# Patient Record
Sex: MALE | Race: WHITE | NOT HISPANIC OR LATINO
[De-identification: names, ages, dates, MRNs, and addresses within clinical notes are randomized per-mention and may not be internally consistent; named-entity substitution may affect disease eponyms.]

---

## 2018-11-26 ENCOUNTER — APPOINTMENT (OUTPATIENT)
Dept: HEART AND VASCULAR | Facility: CLINIC | Age: 58
End: 2018-11-26
Payer: COMMERCIAL

## 2018-11-26 VITALS
HEART RATE: 96 BPM | SYSTOLIC BLOOD PRESSURE: 130 MMHG | OXYGEN SATURATION: 96 % | BODY MASS INDEX: 33.86 KG/M2 | RESPIRATION RATE: 12 BRPM | WEIGHT: 250 LBS | DIASTOLIC BLOOD PRESSURE: 76 MMHG | HEIGHT: 72 IN

## 2018-11-26 DIAGNOSIS — Z82.3 FAMILY HISTORY OF STROKE: ICD-10-CM

## 2018-11-26 DIAGNOSIS — Z87.438 PERSONAL HISTORY OF OTHER DISEASES OF MALE GENITAL ORGANS: ICD-10-CM

## 2018-11-26 DIAGNOSIS — Z78.9 OTHER SPECIFIED HEALTH STATUS: ICD-10-CM

## 2018-11-26 PROBLEM — Z00.00 ENCOUNTER FOR PREVENTIVE HEALTH EXAMINATION: Status: ACTIVE | Noted: 2018-11-26

## 2018-11-26 PROCEDURE — 93306 TTE W/DOPPLER COMPLETE: CPT

## 2018-11-26 PROCEDURE — 93880 EXTRACRANIAL BILAT STUDY: CPT

## 2018-11-26 PROCEDURE — 99204 OFFICE O/P NEW MOD 45 MIN: CPT

## 2018-11-26 RX ORDER — ADHESIVE TAPE 3"X 2.3 YD
50 MCG TAPE, NON-MEDICATED TOPICAL
Refills: 0 | Status: ACTIVE | COMMUNITY

## 2018-11-26 RX ORDER — TADALAFIL 20 MG/1
20 TABLET, FILM COATED ORAL
Refills: 0 | Status: ACTIVE | COMMUNITY

## 2018-11-26 NOTE — PHYSICAL EXAM
[General Appearance - Well Developed] : well developed [Normal Appearance] : normal appearance [Well Groomed] : well groomed [General Appearance - Well Nourished] : well nourished [No Deformities] : no deformities [General Appearance - In No Acute Distress] : no acute distress [Normal Oral Mucosa] : normal oral mucosa [No Oral Pallor] : no oral pallor [No Oral Cyanosis] : no oral cyanosis [Normal Jugular Venous A Waves Present] : normal jugular venous A waves present [Normal Jugular Venous V Waves Present] : normal jugular venous V waves present [No Jugular Venous Billy A Waves] : no jugular venous billy A waves [5th Left ICS - MCL] : palpated at the 5th LICS in the midclavicular line [Normal] : normal [Tachycardia] : tachycardic [Irregularly Irregular] : irregularly irregular [Normal S1] : normal S1 [Normal S2] : normal S2 [III] : a grade 3 [Right Carotid Bruit] : right carotid bruit heard [Left Carotid Bruit] : left carotid bruit heard [No Pitting Edema] : no pitting edema present [Respiration, Rhythm And Depth] : normal respiratory rhythm and effort [Exaggerated Use Of Accessory Muscles For Inspiration] : no accessory muscle use [Auscultation Breath Sounds / Voice Sounds] : lungs were clear to auscultation bilaterally [Abdomen Soft] : soft [Abdomen Tenderness] : non-tender [Abdomen Mass (___ Cm)] : no abdominal mass palpated [Abnormal Walk] : normal gait [Gait - Sufficient For Exercise Testing] : the gait was sufficient for exercise testing [Nail Clubbing] : no clubbing of the fingernails [Cyanosis, Localized] : no localized cyanosis [Petechial Hemorrhages (___cm)] : no petechial hemorrhages [] : no ischemic changes [Oriented To Time, Place, And Person] : oriented to person, place, and time [Affect] : the affect was normal [Mood] : the mood was normal [No Anxiety] : not feeling anxious

## 2018-11-28 ENCOUNTER — APPOINTMENT (OUTPATIENT)
Dept: HEART AND VASCULAR | Facility: CLINIC | Age: 58
End: 2018-11-28
Payer: COMMERCIAL

## 2018-11-28 DIAGNOSIS — R53.83 OTHER FATIGUE: ICD-10-CM

## 2018-11-28 PROCEDURE — 93000 ELECTROCARDIOGRAM COMPLETE: CPT

## 2018-11-28 PROCEDURE — 99212 OFFICE O/P EST SF 10 MIN: CPT

## 2018-11-29 ENCOUNTER — RESULT CHARGE (OUTPATIENT)
Age: 58
End: 2018-11-29

## 2018-11-29 VITALS — SYSTOLIC BLOOD PRESSURE: 128 MMHG | DIASTOLIC BLOOD PRESSURE: 78 MMHG | HEART RATE: 107 BPM

## 2018-11-29 PROBLEM — R53.83 FATIGUE: Status: ACTIVE | Noted: 2018-11-29

## 2018-11-29 NOTE — DISCUSSION/SUMMARY
[Patient] : the patient [FreeTextEntry1] : Start Cardizem 120mg XR; come back in 1 week for FU and repeat EKG.\par Pt may need to plan for cardiac ablasion if does not conver to NSR.\par

## 2018-11-29 NOTE — REASON FOR VISIT
[Follow-Up - Clinic] : a clinic follow-up of [Atrial Fibrillation] : atrial fibrillation [Dizziness] : dizziness [FreeTextEntry1] : Pt recently started on Metoprolol Succ ER 50mg x daily for a-fib; reported feeling dizzy and fatigued today.

## 2018-11-29 NOTE — PHYSICAL EXAM
[General Appearance - Well Developed] : well developed [Normal Appearance] : normal appearance [Well Groomed] : well groomed [General Appearance - Well Nourished] : well nourished [No Deformities] : no deformities [General Appearance - In No Acute Distress] : no acute distress [Normal Jugular Venous A Waves Present] : normal jugular venous A waves present [Normal Jugular Venous V Waves Present] : normal jugular venous V waves present [No Jugular Venous Billy A Waves] : no jugular venous billy A waves [] : no respiratory distress [Respiration, Rhythm And Depth] : normal respiratory rhythm and effort [Exaggerated Use Of Accessory Muscles For Inspiration] : no accessory muscle use [Auscultation Breath Sounds / Voice Sounds] : lungs were clear to auscultation bilaterally [Heart Rate And Rhythm] : heart rate and rhythm were normal [Heart Sounds] : normal S1 and S2 [Murmurs] : no murmurs present [Abnormal Walk] : normal gait [Gait - Sufficient For Exercise Testing] : the gait was sufficient for exercise testing

## 2018-11-29 NOTE — END OF VISIT
[FreeTextEntry3] : Discussed with Dr Garg [Time Spent: ___ minutes] : I have spent [unfilled] minutes of face to face time with the patient

## 2018-12-04 NOTE — HISTORY OF PRESENT ILLNESS
[FreeTextEntry1] : 58-year-old male with a past medical history of hyperlipidemia and erectile dysfunction comes in for evaluation which fibrillation. Patient went to his PCP for routine visit and was found to be in A. fib. Patient denies any palpitations chest pain shortness of breath PND orthopnea.

## 2018-12-04 NOTE — DISCUSSION/SUMMARY
[FreeTextEntry1] : 1. Atrial fibrillation: Will start Toprol 50 mg daily and xarelto 20 mg daily. Will obtain an echocardiogram to assess which is left atrial enlargement. Reviewed EKG from PCP A. fib poor R-wave progression otherwise within normal limits. Patient had recent blood work by PCP will get a copy for review. Will also need to rule out ischemic heart disease in this patient with new onset A. fib, history of erectile dysfunction concern for ischemia driven A. fib. pt advised to stop asa while on xarelto \par chads-vasc score 2\par 2. Hyperlipidemia: Continue atorvastatin\par 3. Carotid bruit: Carotid duplex

## 2018-12-05 ENCOUNTER — APPOINTMENT (OUTPATIENT)
Dept: HEART AND VASCULAR | Facility: CLINIC | Age: 58
End: 2018-12-05
Payer: COMMERCIAL

## 2018-12-05 PROCEDURE — 93000 ELECTROCARDIOGRAM COMPLETE: CPT

## 2018-12-05 PROCEDURE — 99213 OFFICE O/P EST LOW 20 MIN: CPT

## 2018-12-05 RX ORDER — METOPROLOL SUCCINATE 50 MG/1
50 TABLET, EXTENDED RELEASE ORAL
Qty: 30 | Refills: 3 | Status: DISCONTINUED | COMMUNITY
Start: 2018-11-26 | End: 2018-12-05

## 2018-12-06 ENCOUNTER — RESULT CHARGE (OUTPATIENT)
Age: 58
End: 2018-12-06

## 2018-12-06 VITALS — HEART RATE: 87 BPM | DIASTOLIC BLOOD PRESSURE: 80 MMHG | SYSTOLIC BLOOD PRESSURE: 130 MMHG

## 2018-12-06 NOTE — DISCUSSION/SUMMARY
[FreeTextEntry1] : Continue current medications as prescribed. \par Pt is encouraged to call or come back to the office if feels SOB, CP, has blurry vision - pt verbalized good understanding.

## 2018-12-06 NOTE — HISTORY OF PRESENT ILLNESS
[FreeTextEntry1] : Pt is seen in the office for a-fib FU.  Pt was started on Metoprolol Succ 50mg and did not tolerate it well; pt returned to the office and was switched to Cardizem 120mg PO.  Pt tolerates new medications well; pt denies c/o SOB, CP, palpitations, blurry vision, nausea, vomiting, fatigue. \par

## 2018-12-06 NOTE — PHYSICAL EXAM
[General Appearance - Well Developed] : well developed [Normal Appearance] : normal appearance [Well Groomed] : well groomed [General Appearance - Well Nourished] : well nourished [No Deformities] : no deformities [General Appearance - In No Acute Distress] : no acute distress [Normal Jugular Venous A Waves Present] : normal jugular venous A waves present [Normal Jugular Venous V Waves Present] : normal jugular venous V waves present [No Jugular Venous Billy A Waves] : no jugular venous billy A waves [Respiration, Rhythm And Depth] : normal respiratory rhythm and effort [Exaggerated Use Of Accessory Muscles For Inspiration] : no accessory muscle use [Auscultation Breath Sounds / Voice Sounds] : lungs were clear to auscultation bilaterally [Heart Rate And Rhythm] : heart rate and rhythm were normal [Heart Sounds] : normal S1 and S2 [Murmurs] : no murmurs present [Nail Clubbing] : no clubbing of the fingernails [Cyanosis, Localized] : no localized cyanosis [Petechial Hemorrhages (___cm)] : no petechial hemorrhages [] : no ischemic changes

## 2018-12-10 RX ORDER — RIVAROXABAN 20 MG/1
20 TABLET, FILM COATED ORAL
Qty: 30 | Refills: 5 | Status: DISCONTINUED | COMMUNITY
Start: 2018-11-26 | End: 2018-12-10

## 2018-12-17 ENCOUNTER — APPOINTMENT (OUTPATIENT)
Dept: HEART AND VASCULAR | Facility: CLINIC | Age: 58
End: 2018-12-17

## 2018-12-21 ENCOUNTER — RX RENEWAL (OUTPATIENT)
Age: 58
End: 2018-12-21

## 2018-12-24 ENCOUNTER — APPOINTMENT (OUTPATIENT)
Dept: HEART AND VASCULAR | Facility: CLINIC | Age: 58
End: 2018-12-24

## 2018-12-26 ENCOUNTER — APPOINTMENT (OUTPATIENT)
Dept: HEART AND VASCULAR | Facility: CLINIC | Age: 58
End: 2018-12-26

## 2019-01-03 ENCOUNTER — RX RENEWAL (OUTPATIENT)
Age: 59
End: 2019-01-03

## 2019-01-14 RX ORDER — ATORVASTATIN CALCIUM 40 MG/1
40 TABLET, FILM COATED ORAL
Refills: 0 | Status: DISCONTINUED | COMMUNITY
End: 2019-01-14

## 2019-02-05 ENCOUNTER — APPOINTMENT (OUTPATIENT)
Dept: HEART AND VASCULAR | Facility: CLINIC | Age: 59
End: 2019-02-05

## 2019-02-05 RX ORDER — DILTIAZEM HYDROCHLORIDE 120 MG/1
120 CAPSULE, EXTENDED RELEASE ORAL DAILY
Qty: 30 | Refills: 2 | Status: DISCONTINUED | COMMUNITY
Start: 2018-11-28 | End: 2019-02-05

## 2019-02-12 ENCOUNTER — APPOINTMENT (OUTPATIENT)
Dept: HEART AND VASCULAR | Facility: CLINIC | Age: 59
End: 2019-02-12
Payer: COMMERCIAL

## 2019-02-12 VITALS
RESPIRATION RATE: 12 BRPM | SYSTOLIC BLOOD PRESSURE: 114 MMHG | HEART RATE: 96 BPM | DIASTOLIC BLOOD PRESSURE: 88 MMHG | WEIGHT: 250 LBS | BODY MASS INDEX: 33.86 KG/M2 | HEIGHT: 72 IN

## 2019-02-12 PROCEDURE — 93000 ELECTROCARDIOGRAM COMPLETE: CPT

## 2019-02-12 PROCEDURE — 99213 OFFICE O/P EST LOW 20 MIN: CPT

## 2019-02-14 ENCOUNTER — APPOINTMENT (OUTPATIENT)
Dept: HEART AND VASCULAR | Facility: CLINIC | Age: 59
End: 2019-02-14
Payer: COMMERCIAL

## 2019-02-14 PROCEDURE — 93224 XTRNL ECG REC UP TO 48 HRS: CPT

## 2019-02-21 NOTE — DISCUSSION/SUMMARY
[FreeTextEntry1] : 1. Atrial fibrillation: For now will continue Cardizem 240 mg daily will obtain an EKG. If in normal sinus rhythm will consider a 24-hour Holter monitor to ascertain heart rate variability. This patient's resting heart rate today is . If well controlled in sinus rhythm will reattempt the non-nuclear stress test.\par addendum: ekg reviewed sinus tach. will place 24hr holter monitor to asses HRV \par \par ADDENDUM 2/21/19: PT OPTIMIZED FOR PLANNED PROCEDURE (EGD/CF) HOLD ELIQUIS FOR 3 DOSE PRIOR TO PROCEDURE.

## 2019-02-21 NOTE — HISTORY OF PRESENT ILLNESS
[FreeTextEntry1] : 58-year-old male with a past medical history of paroxysmal atrial fibrillation comes in for followup. She was scheduled for a non-nuclear stress test was found to be in rapid A. fib test was aborted and patient's Cardizem was increased to 240 mg. Patient has been tolerating Cardizem without any major side effects. Patient does report as of late anxiety and stress in his life.

## 2019-02-21 NOTE — PHYSICAL EXAM
[General Appearance - Well Developed] : well developed [Normal Appearance] : normal appearance [Well Groomed] : well groomed [General Appearance - Well Nourished] : well nourished [No Deformities] : no deformities [General Appearance - In No Acute Distress] : no acute distress [Normal Oral Mucosa] : normal oral mucosa [No Oral Pallor] : no oral pallor [No Oral Cyanosis] : no oral cyanosis [Normal Jugular Venous A Waves Present] : normal jugular venous A waves present [Normal Jugular Venous V Waves Present] : normal jugular venous V waves present [No Jugular Venous Billy A Waves] : no jugular venous billy A waves [Respiration, Rhythm And Depth] : normal respiratory rhythm and effort [Exaggerated Use Of Accessory Muscles For Inspiration] : no accessory muscle use [Auscultation Breath Sounds / Voice Sounds] : lungs were clear to auscultation bilaterally [Heart Sounds] : normal S1 and S2 [Arterial Pulses Normal] : the arterial pulses were normal [Edema] : no peripheral edema present [Abdomen Soft] : soft [Abdomen Tenderness] : non-tender [Abdomen Mass (___ Cm)] : no abdominal mass palpated [Abnormal Walk] : normal gait [Gait - Sufficient For Exercise Testing] : the gait was sufficient for exercise testing [Nail Clubbing] : no clubbing of the fingernails [Cyanosis, Localized] : no localized cyanosis [Petechial Hemorrhages (___cm)] : no petechial hemorrhages [] : no ischemic changes [Oriented To Time, Place, And Person] : oriented to person, place, and time [Affect] : the affect was normal [Mood] : the mood was normal [No Anxiety] : not feeling anxious [FreeTextEntry1] : padmini

## 2019-04-09 ENCOUNTER — RX RENEWAL (OUTPATIENT)
Age: 59
End: 2019-04-09

## 2019-05-28 ENCOUNTER — APPOINTMENT (OUTPATIENT)
Dept: HEART AND VASCULAR | Facility: CLINIC | Age: 59
End: 2019-05-28
Payer: COMMERCIAL

## 2019-05-28 VITALS
RESPIRATION RATE: 12 BRPM | HEIGHT: 72 IN | SYSTOLIC BLOOD PRESSURE: 110 MMHG | DIASTOLIC BLOOD PRESSURE: 78 MMHG | HEART RATE: 98 BPM | BODY MASS INDEX: 33.86 KG/M2 | WEIGHT: 250 LBS

## 2019-05-28 PROCEDURE — 99214 OFFICE O/P EST MOD 30 MIN: CPT

## 2019-05-28 NOTE — DISCUSSION/SUMMARY
[FreeTextEntry1] : 1. Atrial fibrillation: For now continue diltiazem 240 mg as well as a liquid 5 mg twice daily will obtain an EKG.\par 2. Hyperlipidemia: Continue Crestor atenolol for that\par 3. Fatigue/shortness of breath: Still have not ruled out ischemic heart disease as a cause of new onset A. fib as well as exertional shortness of breath. Patient unable to complete a Kirit protocol due to resting heart rates in the 90s and low 100s due to anxiety and stress from being in the office will attempt to obtain prior authorization for a lexiscan nuclear stress test

## 2019-05-28 NOTE — HISTORY OF PRESENT ILLNESS
[FreeTextEntry1] : 58-year-old male with a past medical history of atrial fibrillation, diabetes hyperlipidemia comes in for routine followup. Patient denies any chest pain PND or orthopnea. Attempted to obtain a nuclear stress test as well as a non-nuclear stress test unsuccessfully due to resting heart rates in the high 90s low 100s. Patient reports an average heart rates are in the 80s which was proven on Holter monitor.

## 2019-05-28 NOTE — PHYSICAL EXAM
[General Appearance - Well Developed] : well developed [Normal Appearance] : normal appearance [Well Groomed] : well groomed [No Deformities] : no deformities [General Appearance - Well Nourished] : well nourished [General Appearance - In No Acute Distress] : no acute distress [Normal Oral Mucosa] : normal oral mucosa [No Oral Pallor] : no oral pallor [No Oral Cyanosis] : no oral cyanosis [Normal Jugular Venous A Waves Present] : normal jugular venous A waves present [Normal Jugular Venous V Waves Present] : normal jugular venous V waves present [No Jugular Venous Billy A Waves] : no jugular venous billy A waves [Respiration, Rhythm And Depth] : normal respiratory rhythm and effort [Exaggerated Use Of Accessory Muscles For Inspiration] : no accessory muscle use [Auscultation Breath Sounds / Voice Sounds] : lungs were clear to auscultation bilaterally [Heart Sounds] : normal S1 and S2 [Arterial Pulses Normal] : the arterial pulses were normal [Edema] : no peripheral edema present [FreeTextEntry1] : irreg irreg [Abdomen Soft] : soft [Abdomen Tenderness] : non-tender [Abdomen Mass (___ Cm)] : no abdominal mass palpated [Abnormal Walk] : normal gait [Gait - Sufficient For Exercise Testing] : the gait was sufficient for exercise testing [Nail Clubbing] : no clubbing of the fingernails [Cyanosis, Localized] : no localized cyanosis [Petechial Hemorrhages (___cm)] : no petechial hemorrhages [] : no ischemic changes [Oriented To Time, Place, And Person] : oriented to person, place, and time [Mood] : the mood was normal [Affect] : the affect was normal [No Anxiety] : not feeling anxious

## 2019-06-17 ENCOUNTER — APPOINTMENT (OUTPATIENT)
Dept: HEART AND VASCULAR | Facility: CLINIC | Age: 59
End: 2019-06-17
Payer: COMMERCIAL

## 2019-06-17 VITALS
BODY MASS INDEX: 33.86 KG/M2 | HEART RATE: 79 BPM | DIASTOLIC BLOOD PRESSURE: 70 MMHG | SYSTOLIC BLOOD PRESSURE: 120 MMHG | HEIGHT: 72 IN | WEIGHT: 250 LBS

## 2019-06-17 DIAGNOSIS — R06.02 SHORTNESS OF BREATH: ICD-10-CM

## 2019-06-17 PROCEDURE — 96374 THER/PROPH/DIAG INJ IV PUSH: CPT | Mod: 59

## 2019-06-17 PROCEDURE — 93015 CV STRESS TEST SUPVJ I&R: CPT

## 2019-06-17 PROCEDURE — 78452 HT MUSCLE IMAGE SPECT MULT: CPT

## 2019-06-17 PROCEDURE — A9500: CPT

## 2019-06-17 NOTE — PHYSICAL EXAM
[General Appearance - Well Developed] : well developed [Normal Appearance] : normal appearance [General Appearance - Well Nourished] : well nourished [Well Groomed] : well groomed [No Deformities] : no deformities [General Appearance - In No Acute Distress] : no acute distress [Eyelids - No Xanthelasma] : the eyelids demonstrated no xanthelasmas [Normal Conjunctiva] : the conjunctiva exhibited no abnormalities [Normal Oral Mucosa] : normal oral mucosa [No Oral Pallor] : no oral pallor [No Oral Cyanosis] : no oral cyanosis [Normal Jugular Venous A Waves Present] : normal jugular venous A waves present [Normal Jugular Venous V Waves Present] : normal jugular venous V waves present [] : no respiratory distress [No Jugular Venous Billy A Waves] : no jugular venous billy A waves [Respiration, Rhythm And Depth] : normal respiratory rhythm and effort [Auscultation Breath Sounds / Voice Sounds] : lungs were clear to auscultation bilaterally [Exaggerated Use Of Accessory Muscles For Inspiration] : no accessory muscle use [Heart Rate And Rhythm] : heart rate and rhythm were normal [Heart Sounds] : normal S1 and S2 [Murmurs] : no murmurs present

## 2019-09-03 ENCOUNTER — RX RENEWAL (OUTPATIENT)
Age: 59
End: 2019-09-03

## 2019-10-28 ENCOUNTER — APPOINTMENT (OUTPATIENT)
Dept: HEART AND VASCULAR | Facility: CLINIC | Age: 59
End: 2019-10-28
Payer: COMMERCIAL

## 2019-10-28 VITALS
HEIGHT: 72 IN | WEIGHT: 251 LBS | HEART RATE: 86 BPM | RESPIRATION RATE: 12 BRPM | DIASTOLIC BLOOD PRESSURE: 78 MMHG | BODY MASS INDEX: 34 KG/M2 | SYSTOLIC BLOOD PRESSURE: 124 MMHG

## 2019-10-28 PROCEDURE — 99214 OFFICE O/P EST MOD 30 MIN: CPT

## 2019-10-28 PROCEDURE — 93000 ELECTROCARDIOGRAM COMPLETE: CPT

## 2019-10-28 NOTE — HISTORY OF PRESENT ILLNESS
[FreeTextEntry1] : 58-year-old male with a past medical history of diabetes paroxysmal atrial fibrillation comes in for routine followup. Patient denies any chest pain shortness of breath PND orthopnea. Overall feeling well.

## 2019-10-28 NOTE — DISCUSSION/SUMMARY
[FreeTextEntry1] : 1. Atrial fibrillation: Continue diltiazem 240 mg one tablet daily continue eliquis 5 mg twice daily\par 2. Hyperlipidemia: Continue Crestor 10 mg daily

## 2019-10-28 NOTE — PHYSICAL EXAM
[General Appearance - Well Developed] : well developed [Normal Appearance] : normal appearance [Well Groomed] : well groomed [General Appearance - Well Nourished] : well nourished [No Deformities] : no deformities [General Appearance - In No Acute Distress] : no acute distress [Normal Oral Mucosa] : normal oral mucosa [No Oral Pallor] : no oral pallor [No Oral Cyanosis] : no oral cyanosis [Normal Jugular Venous A Waves Present] : normal jugular venous A waves present [Normal Jugular Venous V Waves Present] : normal jugular venous V waves present [No Jugular Venous Billy A Waves] : no jugular venous billy A waves [Respiration, Rhythm And Depth] : normal respiratory rhythm and effort [Exaggerated Use Of Accessory Muscles For Inspiration] : no accessory muscle use [Auscultation Breath Sounds / Voice Sounds] : lungs were clear to auscultation bilaterally [Heart Sounds] : normal S1 and S2 [Murmurs] : no murmurs present [Arterial Pulses Normal] : the arterial pulses were normal [Edema] : no peripheral edema present [FreeTextEntry1] : irreg irreg [Abdomen Soft] : soft [Abdomen Tenderness] : non-tender [Abdomen Mass (___ Cm)] : no abdominal mass palpated [Abnormal Walk] : normal gait [Gait - Sufficient For Exercise Testing] : the gait was sufficient for exercise testing [Nail Clubbing] : no clubbing of the fingernails [Cyanosis, Localized] : no localized cyanosis [Petechial Hemorrhages (___cm)] : no petechial hemorrhages [] : no ischemic changes [Oriented To Time, Place, And Person] : oriented to person, place, and time [Affect] : the affect was normal [Mood] : the mood was normal [No Anxiety] : not feeling anxious

## 2020-01-02 ENCOUNTER — RX RENEWAL (OUTPATIENT)
Age: 60
End: 2020-01-02

## 2020-04-28 ENCOUNTER — RX RENEWAL (OUTPATIENT)
Age: 60
End: 2020-04-28

## 2020-05-06 RX ORDER — DILTIAZEM HYDROCHLORIDE 240 MG/1
240 CAPSULE, EXTENDED RELEASE ORAL
Qty: 30 | Refills: 3 | Status: DISCONTINUED | COMMUNITY
Start: 2019-02-05 | End: 2020-05-06

## 2020-06-03 ENCOUNTER — APPOINTMENT (OUTPATIENT)
Dept: UROLOGY | Facility: CLINIC | Age: 60
End: 2020-06-03
Payer: COMMERCIAL

## 2020-06-03 VITALS
DIASTOLIC BLOOD PRESSURE: 83 MMHG | HEART RATE: 120 BPM | BODY MASS INDEX: 34 KG/M2 | WEIGHT: 251 LBS | SYSTOLIC BLOOD PRESSURE: 141 MMHG | HEIGHT: 72 IN

## 2020-06-03 DIAGNOSIS — N50.0 ATROPHY OF TESTIS: ICD-10-CM

## 2020-06-03 PROCEDURE — 99204 OFFICE O/P NEW MOD 45 MIN: CPT

## 2020-06-19 ENCOUNTER — APPOINTMENT (OUTPATIENT)
Dept: UROLOGY | Facility: CLINIC | Age: 60
End: 2020-06-19
Payer: COMMERCIAL

## 2020-06-19 VITALS
DIASTOLIC BLOOD PRESSURE: 82 MMHG | SYSTOLIC BLOOD PRESSURE: 144 MMHG | HEIGHT: 72 IN | TEMPERATURE: 97.6 F | HEART RATE: 86 BPM | WEIGHT: 250 LBS | BODY MASS INDEX: 33.86 KG/M2

## 2020-06-19 PROCEDURE — 99214 OFFICE O/P EST MOD 30 MIN: CPT

## 2020-06-19 NOTE — HISTORY OF PRESENT ILLNESS
[Erectile Dysfunction] : Erectile Dysfunction [FreeTextEntry1] : Jessica is a 51-year-old male We have been following for erectile dysfunction. He has had intermittent erectile dysfunction refractory to Cialis. He presents today to review his blood work and [place] criteria.\par \par He did obtain a scrotal ultrasound for possible epididymal cyst however, the imaging center was closed And we don't have the report. He will fax us his copy of the results to review with him When we review the 4K blood test that he is going to get, please see below\par

## 2020-06-19 NOTE — HISTORY OF PRESENT ILLNESS
[Erectile Dysfunction] : Erectile Dysfunction [FreeTextEntry1] : Rabbi Scott is a 51-year-old male who is  to his wife who is 52 years old in excellent relationship for 34 years. They have had a good sex life though for the last 89 or even 10 years he’s needed Cialis initially at the 10 now is up to the 20 mg dose. About six months ago that stopped working as well. There are times he cannot get an erection lately eight of the 10 times save last tried he can get rich enough to get in. Wanted to a satisfactory six he popped right away and to he couldn’t even get written enough to penetrate. His nighttime in morning erections are as hard as they have ever been implying that this is arterial not venous in origin. He feels there libidos normal, he finds her sexually stimulating and he does not think any of the problems to their relationship.\par \par He is a non-insulin-dependent diabetic with elevated cholesterol and about 18 months ago develop paroxysmal atrial fibrillation. He had and inguinal hernia repaired many years ago and now takes Metformin, diltiazem, Crestor, eloquence, vitamin D, B12 them when necessary Claritin. In general he feels he is in otherwise good health is a history of injuries or accidents at he’s been using a CPAP machine for 10 years. 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 He will have alcohol on the weekends does not use recreational drugs and “tries” exercise a few times per week but is really not doing what he should. He denies any and all other urologic issues. His family history is only positive for diabetes and heart disease.\par

## 2020-06-19 NOTE — LETTER BODY
[Dear  ___] : Dear  [unfilled], [Courtesy Letter:] : I had the pleasure of seeing your patient, [unfilled], in my office today. [Please see my note below.] : Please see my note below. [Sincerely,] : Sincerely, [Consult Closing:] : Thank you very much for allowing me to participate in the care of this patient.  If you have any questions, please do not hesitate to contact me. [FreeTextEntry2] : Desmond Coyle M.D.\par [address] [place] [address]\par [place] [phone]\par

## 2020-06-19 NOTE — LETTER HEADER
[FreeTextEntry3] : Martha Reynolds M.D.\par Director of Urology\par [facility]\par [address] [place] [address], [address]\[place] [address]

## 2020-06-19 NOTE — PHYSICAL EXAM
[General Appearance - Well Developed] : well developed [Normal Appearance] : normal appearance [Well Groomed] : well groomed [General Appearance - Well Nourished] : well nourished [General Appearance - In No Acute Distress] : no acute distress [Edema] : no peripheral edema [] : no respiratory distress [Respiration, Rhythm And Depth] : normal respiratory rhythm and effort [Exaggerated Use Of Accessory Muscles For Inspiration] : no accessory muscle use [Auscultation Breath Sounds / Voice Sounds] : lungs were clear to auscultation bilaterally [Abdomen Soft] : soft [Abdomen Tenderness] : non-tender [Abdomen Hernia] : no hernia was discovered [Costovertebral Angle Tenderness] : no ~M costovertebral angle tenderness [Penis Abnormality] : normal circumcised penis [Urethral Meatus] : meatus normal [Testes Tenderness] : no tenderness of the testes [Rectal Exam - Rectum] : digital rectal exam was normal [Testes Mass (___cm)] : there were no testicular masses [Anus Abnormality] : the anus and perineum were normal [Prostate Tenderness] : the prostate was not tender [No Prostate Nodules] : no prostate nodules [Prostate Size ___ gm] : prostate size [unfilled] gm [Normal Station and Gait] : the gait and station were normal for the patient's age [Oriented To Time, Place, And Person] : oriented to person, place, and time [Affect] : the affect was normal [Not Anxious] : not anxious [Mood] : the mood was normal [Inguinal Lymph Nodes Enlarged Bilaterally] : inguinal [FreeTextEntry1] : DTR's & BC reflexes were intact

## 2020-06-19 NOTE — ASSESSMENT
[FreeTextEntry1] : His hormones are within normal limits. We discussed Doppler study to try injectables however, he reports that his erections are decent enough for now on Cialis. He feels he has been getting a bit better regards to his erectile quality.\par \par More concerning is that he has a PSA of 3.7 with a 17.6% free fraction, elevated by age specific criteria. We discussed the options and the concept of a specific criteria, in essence, we raise the sensitivity and lower the specificity for younger men who have less of a risk of biopsy and treatment and more of longevity to lose if indeed prostate cancer is found in the lower  the sensitivity and raises specificity in older men for whom biopsy under treatment are of increased risk and they have shorter predicted longevity during which should develop complications of prostate cancer if present. He will obtain a 4K score and followup via telemedicine to discuss. If elevated we will consider MRI versus biopsy.\par \par He will E. mail me his scrotal ultrasound results

## 2020-06-19 NOTE — LETTER HEADER
[FreeTextEntry3] : Martha Reynolds M.D.\par Director of Urology\par [facility]\par [address] [place] [address], [address]\[place] [address]

## 2020-06-19 NOTE — ASSESSMENT
[FreeTextEntry1] : The exam showed morbid obesity. The penis did not have any plaques there was no significant atrophy. The testicles to have atrophy and that are left epidermis is underrated possibly a spermatocele. Digital rectal exam showed good tone, a normal BC reflex with a small prostate without nodules. Examination the periphery showed acceptable pulses though somewhat decreased and there was minimal edema.\par \par One of the main reasons why a pill that’s working stops in ED is that the hormones are dropping so we will get morning bloods for formal panel. As far as the scrotum we will get a scrotal ultrasound to see if the spermatocele is indeed what I think it is or if it’s solid. Under want [place] criteria classification as though someone is giving him Cialis I want to make sure his cardiologist feels that it’s okay and we will then have a telemedicine meeting to review the results.\par

## 2020-06-19 NOTE — LETTER HEADER
[FreeTextEntry3] : Martha Reynolds M.D.\par Director of Urology\par [facility]\par [address] [place] [address], [address]\[place] [address]

## 2020-06-19 NOTE — ASSESSMENT
[FreeTextEntry1] : The exam showed morbid obesity. The penis did not have any plaques there was no significant atrophy. The testicles to have atrophy and that are left epidermis is underrated possibly a spermatocele. Digital rectal exam showed good tone, a normal BC reflex with a small prostate without nodules. Examination the periphery showed acceptable pulses though somewhat decreased and there was minimal edema.\par \par One of the main reasons why a pill that’s working stops in ED is that the hormones are dropping so we will get morning bloods for formal panel. As far as the scrotum we will get a scrotal ultrasound to see if the spermatocele is indeed what I think it is or if it’s solid. Under want [place] criteria classification as though someone is giving him Cialis I want to make sure his cardiologist feels that it’s okay and we will then have a telemedicine meeting to review the results.\par

## 2020-06-19 NOTE — LETTER BODY
[Dear  ___] : Dear  [unfilled], [Consult Letter:] : I had the pleasure of evaluating your patient, [unfilled]. [Please see my note below.] : Please see my note below. [Consult Closing:] : Thank you very much for allowing me to participate in the care of this patient.  If you have any questions, please do not hesitate to contact me. [Sincerely,] : Sincerely, [FreeTextEntry2] : Desmond Coyle M.D.\par [address] [place] [address]\par [place] [phone]\par

## 2020-06-19 NOTE — PHYSICAL EXAM
[General Appearance - Well Developed] : well developed [General Appearance - Well Nourished] : well nourished [Normal Appearance] : normal appearance [Well Groomed] : well groomed [Edema] : no peripheral edema [General Appearance - In No Acute Distress] : no acute distress [] : no respiratory distress [Respiration, Rhythm And Depth] : normal respiratory rhythm and effort [Exaggerated Use Of Accessory Muscles For Inspiration] : no accessory muscle use [Auscultation Breath Sounds / Voice Sounds] : lungs were clear to auscultation bilaterally [Abdomen Soft] : soft [Abdomen Tenderness] : non-tender [Abdomen Hernia] : no hernia was discovered [Costovertebral Angle Tenderness] : no ~M costovertebral angle tenderness [Urethral Meatus] : meatus normal [Penis Abnormality] : normal circumcised penis [Testes Tenderness] : no tenderness of the testes [Testes Mass (___cm)] : there were no testicular masses [Rectal Exam - Rectum] : digital rectal exam was normal [Anus Abnormality] : the anus and perineum were normal [No Prostate Nodules] : no prostate nodules [Prostate Tenderness] : the prostate was not tender [Prostate Size ___ gm] : prostate size [unfilled] gm [Normal Station and Gait] : the gait and station were normal for the patient's age [Oriented To Time, Place, And Person] : oriented to person, place, and time [Affect] : the affect was normal [Not Anxious] : not anxious [Mood] : the mood was normal [Inguinal Lymph Nodes Enlarged Bilaterally] : inguinal [FreeTextEntry1] : DTR's & BC reflexes were intact

## 2020-06-19 NOTE — LETTER BODY
[Dear  ___] : Dear  [unfilled], [Consult Letter:] : I had the pleasure of evaluating your patient, [unfilled]. [Please see my note below.] : Please see my note below. [Consult Closing:] : Thank you very much for allowing me to participate in the care of this patient.  If you have any questions, please do not hesitate to contact me. [Sincerely,] : Sincerely, [FreeTextEntry2] : Desmond Coyle M.D.\par [address] [place] [address]\par [place] [phone]\par

## 2020-06-19 NOTE — HISTORY OF PRESENT ILLNESS
[Erectile Dysfunction] : Erectile Dysfunction [FreeTextEntry1] : Rabbi Scott is a 51-year-old male who is  to his wife who is 52 years old in excellent relationship for 34 years. They have had a good sex life though for the last 89 or even 10 years he’s needed Cialis initially at the 10 now is up to the 20 mg dose. About six months ago that stopped working as well. There are times he cannot get an erection lately eight of the 10 times save last tried he can get rich enough to get in. Wanted to a satisfactory six he popped right away and to he couldn’t even get written enough to penetrate. His nighttime in morning erections are as hard as they have ever been implying that this is arterial not venous in origin. He feels there libidos normal, he finds her sexually stimulating and he does not think any of the problems to their relationship.\par \par He is a non-insulin-dependent diabetic with elevated cholesterol and about 18 months ago develop paroxysmal atrial fibrillation. He had and inguinal hernia repaired many years ago and now takes Metformin, diltiazem, Crestor, eloquence, vitamin D, B12 them when necessary Claritin. In general he feels he is in otherwise good health is a history of injuries or accidents at he’s been using a CPAP machine for 10 years. 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 He will have alcohol on the weekends does not use recreational drugs and “tries” exercise a few times per week but is really not doing what he should. He denies any and all other urologic issues. His family history is only positive for diabetes and heart disease.\par

## 2020-06-19 NOTE — PHYSICAL EXAM
[General Appearance - Well Developed] : well developed [General Appearance - Well Nourished] : well nourished [Normal Appearance] : normal appearance [Well Groomed] : well groomed [General Appearance - In No Acute Distress] : no acute distress [Edema] : no peripheral edema [Respiration, Rhythm And Depth] : normal respiratory rhythm and effort [] : no respiratory distress [Exaggerated Use Of Accessory Muscles For Inspiration] : no accessory muscle use [Oriented To Time, Place, And Person] : oriented to person, place, and time [Mood] : the mood was normal [Affect] : the affect was normal [Not Anxious] : not anxious [Normal Station and Gait] : the gait and station were normal for the patient's age [No Focal Deficits] : no focal deficits [FreeTextEntry1] : Obese

## 2020-06-24 ENCOUNTER — APPOINTMENT (OUTPATIENT)
Dept: HEART AND VASCULAR | Facility: CLINIC | Age: 60
End: 2020-06-24
Payer: COMMERCIAL

## 2020-06-24 ENCOUNTER — NON-APPOINTMENT (OUTPATIENT)
Age: 60
End: 2020-06-24

## 2020-06-24 VITALS
HEIGHT: 72 IN | BODY MASS INDEX: 33.18 KG/M2 | SYSTOLIC BLOOD PRESSURE: 104 MMHG | HEART RATE: 110 BPM | WEIGHT: 245 LBS | DIASTOLIC BLOOD PRESSURE: 70 MMHG

## 2020-06-24 PROCEDURE — 93306 TTE W/DOPPLER COMPLETE: CPT

## 2020-06-24 PROCEDURE — 93000 ELECTROCARDIOGRAM COMPLETE: CPT

## 2020-06-24 PROCEDURE — 99214 OFFICE O/P EST MOD 30 MIN: CPT | Mod: 25

## 2020-06-24 RX ORDER — DILTIAZEM HYDROCHLORIDE 360 MG/1
360 TABLET, EXTENDED RELEASE ORAL
Qty: 90 | Refills: 3 | Status: DISCONTINUED | COMMUNITY
Start: 2020-05-06 | End: 2020-06-24

## 2020-06-24 NOTE — PHYSICAL EXAM
[General Appearance - Well Developed] : well developed [Well Groomed] : well groomed [Normal Appearance] : normal appearance [General Appearance - Well Nourished] : well nourished [Normal Oral Mucosa] : normal oral mucosa [General Appearance - In No Acute Distress] : no acute distress [No Deformities] : no deformities [No Oral Cyanosis] : no oral cyanosis [No Oral Pallor] : no oral pallor [Normal Jugular Venous V Waves Present] : normal jugular venous V waves present [Normal Jugular Venous A Waves Present] : normal jugular venous A waves present [No Jugular Venous Billy A Waves] : no jugular venous billy A waves [Respiration, Rhythm And Depth] : normal respiratory rhythm and effort [Exaggerated Use Of Accessory Muscles For Inspiration] : no accessory muscle use [Heart Sounds] : normal S1 and S2 [Auscultation Breath Sounds / Voice Sounds] : lungs were clear to auscultation bilaterally [Arterial Pulses Normal] : the arterial pulses were normal [Edema] : no peripheral edema present [Abdomen Tenderness] : non-tender [Abdomen Soft] : soft [Gait - Sufficient For Exercise Testing] : the gait was sufficient for exercise testing [Abdomen Mass (___ Cm)] : no abdominal mass palpated [Abnormal Walk] : normal gait [Petechial Hemorrhages (___cm)] : no petechial hemorrhages [Nail Clubbing] : no clubbing of the fingernails [Cyanosis, Localized] : no localized cyanosis [] : no ischemic changes [Affect] : the affect was normal [Oriented To Time, Place, And Person] : oriented to person, place, and time [Mood] : the mood was normal [No Anxiety] : not feeling anxious [FreeTextEntry1] : irreg irreg OCTAVIANO

## 2020-06-24 NOTE — HISTORY OF PRESENT ILLNESS
[FreeTextEntry1] : 59-year-old male with a past medical history of atrial fibrillation diabetes  hyperlipidemia comes in for  followup. Overall, patient reports feeling well however ever since rita coronavirus a number of months ago has had rapid A. fib despite adjustments in Cardizem. Denies any shortness of breath PND orthopnea

## 2020-06-24 NOTE — DISCUSSION/SUMMARY
[FreeTextEntry1] : 1. A. fib with RVR: At this point patient is maximized on Cardizem with a systolic blood pressure of 104. will place 1 week event monitor to assess average hr, prior to adding meds. Patient had recent blood work with a copy for review specifically TFTs. Continue eliquis\par 2. Diabetes: Continue metformin and follow up with PCP for A1c\par 3. Hyperlipidemia: Continue Crestor\par 4. Mitral insufficiency: echocardiogram

## 2020-06-26 LAB
4K SCORE CALCULATION: 12 %
FREE PSA: 0.7 NG/ML
PERCENT FREE PSA: 17 %
TOTAL PSA: 4.11 NG/ML

## 2020-06-29 ENCOUNTER — APPOINTMENT (OUTPATIENT)
Dept: UROLOGY | Facility: CLINIC | Age: 60
End: 2020-06-29
Payer: COMMERCIAL

## 2020-06-29 ENCOUNTER — APPOINTMENT (OUTPATIENT)
Dept: UROLOGY | Facility: CLINIC | Age: 60
End: 2020-06-29

## 2020-06-29 DIAGNOSIS — N20.1 CALCULUS OF URETER: ICD-10-CM

## 2020-06-29 DIAGNOSIS — N43.41 SPERMATOCELE OF EPIDIDYMIS, SINGLE: ICD-10-CM

## 2020-06-29 PROCEDURE — 99215 OFFICE O/P EST HI 40 MIN: CPT | Mod: 95

## 2020-06-29 NOTE — HISTORY OF PRESENT ILLNESS
[Home] : at home, [unfilled] , at the time of the visit. [Medical Office: ([facility])___] : at the medical office located in  [Erectile Dysfunction] : Erectile Dysfunction [FreeTextEntry3] : he has received, reviewed and agreed to the telemedicine consent  [FreeTextEntry1] : I communicated with him by his cell phone at [phone] and any email communications will be sent to [email or web address] \par \par Jessica is a 51-year-old male who we had seen last on June 19, 2020 with erectile dysfunction, a lump in the left epididymis, his PSA had come back 3.7 with the 17.6% free fraction and he is only 59. He had done the scrotal ultrasound but the imaging center was closed and we made arrangement to get a 4K test and he was going to follow-up to go over both of these issues.\par \par We arranged to meet him via telemedicine.\par

## 2020-06-29 NOTE — LETTER HEADER
[FreeTextEntry3] : Martha Reynolds M.D.\par Director of Urology\par [facility]\par [address] [place] [address], [address]\[place] [address]

## 2020-06-29 NOTE — LETTER BODY
[Dear  ___] : Dear  [unfilled], [Courtesy Letter:] : I had the pleasure of seeing your patient, [unfilled], in my office today. [Please see my note below.] : Please see my note below. [Consult Closing:] : Thank you very much for allowing me to participate in the care of this patient.  If you have any questions, please do not hesitate to contact me. [Sincerely,] : Sincerely, [FreeTextEntry2] : Desmond Coyle M.D.\par [address] [place] [address]\par [place] [phone]\par

## 2020-06-29 NOTE — PHYSICAL EXAM
[General Appearance - Well Developed] : well developed [General Appearance - Well Nourished] : well nourished [Normal Appearance] : normal appearance [Well Groomed] : well groomed [General Appearance - In No Acute Distress] : no acute distress [Respiration, Rhythm And Depth] : normal respiratory rhythm and effort [] : no respiratory distress [Exaggerated Use Of Accessory Muscles For Inspiration] : no accessory muscle use [Affect] : the affect was normal [Oriented To Time, Place, And Person] : oriented to person, place, and time [Not Anxious] : not anxious [Mood] : the mood was normal [FreeTextEntry1] : obese

## 2020-06-29 NOTE — ASSESSMENT
[FreeTextEntry1] : We went over the results including the fact that the ultrasound though not normal does not require intervention as he is not trying to get anyone pregnant. If it continues to grow we could repeat the ultrasound then.\par \par As far as his erectile dysfunction he’s responded to Cialis once again and at this point as long as it’s working I would leave him on that. All other options would be more invasive\par \par Of greater concern was the elevated 4K, PSA and low free PSA especially with digital rectal exam showing a small prostate. We discussed how these are not diagnostic Paramore vectors and the more issues that we have that are out of the expected range the more the concern for prostate cancer. Especially given the small size and anodular exam of the prostate going toward an MRI. Number one, if negative in may help us avoid biopsy, though not diagnostic it lowers the index of suspicion of a high-grade cancer. Number two if positive it helps guide were to do the biopsy.\par \par He a lot of questions what does it mean what is he going to go through with the diagnosis going to be etc. and we reviewed this telling him that if we knew the answers before the test and if necessary a biopsy we would not have to do the test in the biopsy. I again explained that these are all indications of suspicion and the more positive factors we have the greater the index of suspicion the more like we are to go to biopsy. He is aware that in him a biopsy may be slightly more risk as he will have to stop the Eliquis to have the biopsy.\par \par We also discussed the difference between an ultrasound guided perineum biopsy versus a fusion guided biopsy which would be another advantage to getting the MRI.\par \par The net result is with respect to his scrotum unless it bothers him and grows bigger we will leave it alone.\par With respect to his erections he will stay with the Cialis.\par With respect to his elevated PSA will get an MRI and he will check if he had a recent BMP if not he will get one done before going for the MRI. After the MRI we will either meet here or be with him again over telemedicine. [place] he [place] access at that time. If he needs the biopsy he may need to come in.\par

## 2020-08-06 ENCOUNTER — OUTPATIENT (OUTPATIENT)
Dept: OUTPATIENT SERVICES | Facility: HOSPITAL | Age: 60
LOS: 1 days | Discharge: HOME | End: 2020-08-06
Payer: MEDICAID

## 2020-08-06 DIAGNOSIS — R97.20 ELEVATED PROSTATE SPECIFIC ANTIGEN [PSA]: ICD-10-CM

## 2020-08-06 PROCEDURE — 72197 MRI PELVIS W/O & W/DYE: CPT | Mod: 26

## 2020-08-13 ENCOUNTER — APPOINTMENT (OUTPATIENT)
Dept: UROLOGY | Facility: CLINIC | Age: 60
End: 2020-08-13
Payer: COMMERCIAL

## 2020-08-13 DIAGNOSIS — N52.9 MALE ERECTILE DYSFUNCTION, UNSPECIFIED: ICD-10-CM

## 2020-08-13 PROCEDURE — 99214 OFFICE O/P EST MOD 30 MIN: CPT | Mod: 95

## 2020-08-13 NOTE — LETTER HEADER
[FreeTextEntry3] : Martha Reynolds M.D.\par Director of Urology\par [facility]\par [address] [place] [address], [address]\[place] [address]

## 2020-08-13 NOTE — PHYSICAL EXAM
[General Appearance - Well Developed] : well developed [Normal Appearance] : normal appearance [General Appearance - Well Nourished] : well nourished [Well Groomed] : well groomed [General Appearance - In No Acute Distress] : no acute distress [] : no respiratory distress [Respiration, Rhythm And Depth] : normal respiratory rhythm and effort [Exaggerated Use Of Accessory Muscles For Inspiration] : no accessory muscle use [Oriented To Time, Place, And Person] : oriented to person, place, and time [Affect] : the affect was normal [Not Anxious] : not anxious [Mood] : the mood was normal

## 2020-08-13 NOTE — LETTER BODY
[Dear  ___] : Dear  [unfilled], [Courtesy Letter:] : I had the pleasure of seeing your patient, [unfilled], in my office today. [Please see my note below.] : Please see my note below. [Consult Closing:] : Thank you very much for allowing me to participate in the care of this patient.  If you have any questions, please do not hesitate to contact me. [Sincerely,] : Sincerely, [FreeTextEntry2] : Desmond Coyle M.D.\par [address] [place] [address]\par [place] [phone]\par

## 2020-08-13 NOTE — HISTORY OF PRESENT ILLNESS
[Verbal consent obtained from patient] : the patient, [unfilled] [FreeTextEntry3] : he has received, reviewed and agreed to the telemedicine consent  [FreeTextEntry1] : I communicated with him by his cell phone at [phone] and any email communications will be sent to [email or web address] \par \par Jessica is a 59-year-old [employer] male last seen on June 29 has following for erectile dysfunction, a lump in the left epididymis and a PSA of 3.7/17.6%. We met via telemedicine and reviewed that the ultrasound of the scrotal was consistent with but not diagnostic of vas deferens obstruction but as he was not interested in sperm in order to get someone pregnant it was not something we needed to go through. His 4K score was mildly elevated at 12% (normal is less than or equal to seven) with the PSA now of 4.11 and 17%. This was a greater concern because of the small prostate and digital rectal exam we elected to go for an MRI is here to review that. Please note with respect to his erections he is working with Cialis and he would stay with that\par  [Erectile Dysfunction] : Erectile Dysfunction [Currently Experiencing ___] :  [unfilled]

## 2020-08-13 NOTE — ASSESSMENT
[FreeTextEntry1] : I reviewed with Jessica that this does not mean that he does not have prostate cancer what it means at this point it is acceptable to wait and watch and if the PSA continues to climb he may still need a biopsy. This is even more so as he needs eliquis and we would need to stop that in order to do a biopsy\par \par I recommended that he get a PSA in four months and if is continuously elevated I would send them to see Dr. Solis.\par \par

## 2020-08-21 ENCOUNTER — APPOINTMENT (OUTPATIENT)
Dept: UROLOGY | Facility: CLINIC | Age: 60
End: 2020-08-21

## 2020-08-23 ENCOUNTER — RX RENEWAL (OUTPATIENT)
Age: 60
End: 2020-08-23

## 2021-01-14 ENCOUNTER — APPOINTMENT (OUTPATIENT)
Dept: UROLOGY | Facility: CLINIC | Age: 61
End: 2021-01-14
Payer: COMMERCIAL

## 2021-01-14 PROCEDURE — 99215 OFFICE O/P EST HI 40 MIN: CPT | Mod: 95

## 2021-01-14 NOTE — ASSESSMENT
[FreeTextEntry1] : By general criteria of less than four it is acceptable but by age specific criteria and PSA density it is higher than I’d like. I am recommending that he see Dr. Solis and that would be an in office so he can do an in person exam and we will see what he decides and recommends and Jessica is willing to do so.

## 2021-01-14 NOTE — ADDENDUM
[FreeTextEntry1] : The patient-doctor relationship has been established in a face to face fashion via real time video/audio HIPAA compliant communication using telemedicine software.  The patient's identity has been confirmed.  The patient was previously emailed a copy of the telemedicine consent.  They have had a chance to review and has now given verbal consent and has requested care to be assessed and treated through telemedicine and understands there maybe limitations in this process and they may need further follow up care in the office and or hospital settings.   \par  \par \par Please note I went over the case with Dr. Solis later that night he feels that an MRI can only tell you where to biopsy but cannot tell you not to biopsy. He will see the patient perform an exam talk to him but he will probably be recommending biopsy

## 2021-01-14 NOTE — PHYSICAL EXAM
[General Appearance - Well Developed] : well developed [Normal Appearance] : normal appearance [General Appearance - Well Nourished] : well nourished [Well Groomed] : well groomed [General Appearance - In No Acute Distress] : no acute distress [] : no respiratory distress [Respiration, Rhythm And Depth] : normal respiratory rhythm and effort [Exaggerated Use Of Accessory Muscles For Inspiration] : no accessory muscle use [FreeTextEntry1] : Given the limits of telemedicine he appeared to be moving normally

## 2021-01-14 NOTE — LETTER BODY
[Dear  ___] : Dear  [unfilled], [Courtesy Letter:] : I had the pleasure of seeing your patient, [unfilled], in my office today. [Please see my note below.] : Please see my note below. [Consult Closing:] : Thank you very much for allowing me to participate in the care of this patient.  If you have any questions, please do not hesitate to contact me. [Sincerely,] : Sincerely, [FreeTextEntry2] : Desmond Coyle M.D.\par [address] [place] [address]\par [place] [phone]\par

## 2021-01-14 NOTE — LETTER HEADER
[FreeTextEntry3] : Martha Reynolds M.D.\par Director of Urology\par [facility]\par [address] [place] [address], [address]\[place] [address]

## 2021-01-14 NOTE — HISTORY OF PRESENT ILLNESS
[Home] : at home, [unfilled] , at the time of the visit. [Medical Office: ([facility])___] : at the medical office located in  [FreeTextEntry3] : he has received, reviewed and agreed to the telemedicine consent  [FreeTextEntry1] : I communicated with him by his cell phone at [phone] and any email communications will be sent to [email or web address]\par \par Jessica is a 60-year-old Catholic male who was last seen on August 13, 2020. He initially been seen June 29 He had some erectile dysfunction, a lump in the left epididymis a PSA of 3.7/17.6%. We met via telemedicine and reviewed that the ultrasound was consistent with blockage of the vas deferens causing the lump in the testicle which he did really care about so at this point we would do nothing with that. His 4K score was 12% and the PSA was 4.11 and 17. He had a small prostate on KENJI so we elected to go to an MRI. Finally his erections are working with Cialis we decided to stay with that.\par I recommended that we continue to watch him as the blood test imply the need for biopsy the MRI did not and what we would do is get a PSA in four months and if it was still elevated have him see Dr. Solis our cancer specialist.\par \par

## 2021-06-28 ENCOUNTER — APPOINTMENT (OUTPATIENT)
Dept: UROLOGY | Facility: CLINIC | Age: 61
End: 2021-06-28

## 2021-06-28 DIAGNOSIS — R97.20 ELEVATED PROSTATE, SPECIFIC ANTIGEN [PSA]: ICD-10-CM

## 2021-06-28 NOTE — LETTER HEADER
[FreeTextEntry3] : Martha Reynolds M.D.\par Director of Urology\par [facility]\par [address] [place] [address], [address]\[place] [address]

## 2021-06-28 NOTE — HISTORY OF PRESENT ILLNESS
[Home] : at home, [unfilled] , at the time of the visit. [Medical Office: ([facility])___] : at the medical office located in  [FreeTextEntry3] : he has received, reviewed and agreed to the telemedicine consent  [FreeTextEntry1] : Devan is a 60-year-old male last seen January 14, 2021.  His MRI was a PI-RADS 2 on August 7 his blood tests were within normal limits but elevated by age-specific criteria PSA density and he had an elevated 4K.  I thought he was going to see Dr. Power to call us there was miscommunication and he waited to see me now with the blood test being sent to him being hormones and not PSA.

## 2021-06-28 NOTE — LETTER BODY
[Dear  ___] : Dear  [unfilled], [Courtesy Letter:] : I had the pleasure of seeing your patient, [unfilled], in my office today. [Please see my note below.] : Please see my note below. [Consult Closing:] : Thank you very much for allowing me to participate in the care of this patient.  If you have any questions, please do not hesitate to contact me. [Sincerely,] : Sincerely, [FreeTextEntry2] : Desmond Coyle M.D.\par [address] [place] [address]\par [place] [phone]\par

## 2021-06-28 NOTE — ASSESSMENT
[FreeTextEntry1] : I met with him tonight reviewed the notes and explained that I had told him in January that I wanted him to see Dr. Solis.  When he did not get a call I had also told him if you do not hear within 48 hours to call the staff and he says he misunderstood that.  What he called in May he was sent a slip for hormones which as an aside are low instead of a repeat PSA and he has not been seen since January.  I reviewed with him that we are going to send him a slip for PSA, once that is drawn we will set up an appointment as soon as possible to see the specialist Dr. Solis.  And if he does not hear from the staff by Wednesday at 12:00 he is to call me again I emphasized in the future if I say 1 thing and it does not happen he needs to contact me to see why

## 2021-06-28 NOTE — ADDENDUM
[FreeTextEntry1] : The patient-doctor relationship has been established in a face to face fashion via real time video/audio HIPAA compliant communication using telemedicine software.  The patient's identity has been confirmed.  The patient was previously emailed a copy of the telemedicine consent.  They have had a chance to review and has now given verbal consent and has requested care to be assessed and treated through telemedicine and understands there maybe limitations in this process and they may need further follow up care in the office and or hospital settings.

## 2021-07-08 ENCOUNTER — APPOINTMENT (OUTPATIENT)
Dept: UROLOGY | Facility: CLINIC | Age: 61
End: 2021-07-08
Payer: COMMERCIAL

## 2021-07-08 PROCEDURE — 99214 OFFICE O/P EST MOD 30 MIN: CPT | Mod: 95

## 2021-07-12 NOTE — ASSESSMENT
[FreeTextEntry1] : 61 yo with elevated risk 4K and elevated PSA\par no history of cancer (familial or prior diagnosis)\par \par discussed the MRI\par discussed the 4K\par reviewed the PSA\par \par - will repeat PSA in 6 months\par - no indication for urgent imaging or repeat PSA\par - PSA in 6 months

## 2021-07-12 NOTE — HISTORY OF PRESENT ILLNESS
[Home] : at home, [unfilled] , at the time of the visit. [Medical Office: ([facility])___] : at the medical office located in  [FreeTextEntry1] : 61 yo with elevated risk 4K\par the patient has never had a biopsy\par n family histoy of prostate cancer\par normal prostate exam\par \par MRI prostate - 8/2020\par 54 grams PIRADS 2\par \par PSA \par 3.55 7/2021\par 20% free\par \par 3.9 1/2021\par 16.4% free\par \par 4K 12% 6/2020\par PSA 4.11 with 17% free\par \par \par

## 2021-08-18 ENCOUNTER — APPOINTMENT (OUTPATIENT)
Dept: HEART AND VASCULAR | Facility: CLINIC | Age: 61
End: 2021-08-18
Payer: COMMERCIAL

## 2021-08-18 ENCOUNTER — NON-APPOINTMENT (OUTPATIENT)
Age: 61
End: 2021-08-18

## 2021-08-18 VITALS
SYSTOLIC BLOOD PRESSURE: 150 MMHG | DIASTOLIC BLOOD PRESSURE: 80 MMHG | BODY MASS INDEX: 33.86 KG/M2 | WEIGHT: 250 LBS | RESPIRATION RATE: 12 BRPM | HEIGHT: 72 IN | HEART RATE: 80 BPM

## 2021-08-18 DIAGNOSIS — R73.03 PREDIABETES.: ICD-10-CM

## 2021-08-18 PROCEDURE — 99214 OFFICE O/P EST MOD 30 MIN: CPT | Mod: 25

## 2021-08-18 PROCEDURE — 93000 ELECTROCARDIOGRAM COMPLETE: CPT

## 2021-08-18 PROCEDURE — 99214 OFFICE O/P EST MOD 30 MIN: CPT

## 2021-08-18 PROCEDURE — 93306 TTE W/DOPPLER COMPLETE: CPT

## 2021-08-18 RX ORDER — TESTOSTERONE CYPIONATE 200 MG/ML
200 INJECTION, SOLUTION INTRAMUSCULAR
Qty: 3 | Refills: 0 | Status: DISCONTINUED | COMMUNITY
Start: 2021-01-14 | End: 2021-08-18

## 2021-08-18 NOTE — DISCUSSION/SUMMARY
[FreeTextEntry1] : 1. Atrial fibrillation: Continue diltiazem and eliquis EKG.\par 2. Mitral insufficiency: Echocardiogram to assess for progression of disease\par 3. Diabetes: Continue oral hypoglycemics followup with PCP for A1c\par 4. Hypertension: Patient's blood pressure is high however he had a long wait to be seen and was somewhat stressed. Patient scheduled to see PCP next week Will adjust medication based on blood pressure obtained at that visit if need be.\par 5. Hyperlipidemia: Continue Crestor

## 2021-08-18 NOTE — PHYSICAL EXAM
[Well Developed] : well developed [Well Nourished] : well nourished [No Acute Distress] : no acute distress [Normal Conjunctiva] : normal conjunctiva [Normal Venous Pressure] : normal venous pressure [No Carotid Bruit] : no carotid bruit [Normal S1, S2] : normal S1, S2 [No Rub] : no rub [No Gallop] : no gallop [Murmur] : murmur [Clear Lung Fields] : clear lung fields [Good Air Entry] : good air entry [Soft] : abdomen soft [No Respiratory Distress] : no respiratory distress  [Non Tender] : non-tender [No Masses/organomegaly] : no masses/organomegaly [Normal Bowel Sounds] : normal bowel sounds [Normal Gait] : normal gait [No Edema] : no edema [No Cyanosis] : no cyanosis [No Clubbing] : no clubbing [No Varicosities] : no varicosities [No Rash] : no rash [No Skin Lesions] : no skin lesions [Moves all extremities] : moves all extremities [No Focal Deficits] : no focal deficits [Normal Speech] : normal speech [Alert and Oriented] : alert and oriented [Normal memory] : normal memory [de-identified] : irreg irreg

## 2021-08-18 NOTE — HISTORY OF PRESENT ILLNESS
[FreeTextEntry1] : 60-year-old male with a past medical history diabetes atrial fibrillation on anticoagulation hypertension hyperlipidemia comes in for routine followup. Overall, feels well denies chest pain shortness of breath PND or orthopnea.

## 2021-08-23 LAB
4K SCORE CALCULATION: 13 %
FREE PSA: 0.65 NG/ML
PERCENT FREE PSA: 16 %
TOTAL PSA: 4.02 NG/ML

## 2021-09-10 ENCOUNTER — APPOINTMENT (OUTPATIENT)
Dept: UROLOGY | Facility: CLINIC | Age: 61
End: 2021-09-10
Payer: COMMERCIAL

## 2021-09-10 PROCEDURE — ZZZZZ: CPT

## 2021-09-12 NOTE — ASSESSMENT
[FreeTextEntry1] : 59 yo with elevated risk 4K and elevated PSA\par no history of cancer (familial or prior diagnosis)\par \par discussed the MRI\par discussed the 4K\par reviewed the PSA\par \par - will repeat PSA in 6 months\par - no indication for repeat urgent imaging or repeat PSA\par - PSA in 6 months

## 2021-09-12 NOTE — HISTORY OF PRESENT ILLNESS
[Home] : at home, [unfilled] , at the time of the visit. [Medical Office: ([facility])___] : at the medical office located in  [Verbal consent obtained from patient] : the patient, [unfilled] [FreeTextEntry1] : 59 yo with elevated risk 4K\par the patient has never had a biopsy\par n family histoy of prostate cancer\par normal prostate exam\par \par MRI prostate - 8/2020\par 54 grams PIRADS 2\par \par PSA \par 3.55 7/2021\par 20% free\par \par 3.9 1/2021\par 16.4% free\par \par 4K 12% 6/2020\par PSA 4.11 with 17% free\par \par \par

## 2021-09-12 NOTE — LETTER BODY
[Dear  ___] : Dear  [unfilled], [Consult Letter:] : I had the pleasure of evaluating your patient, [unfilled]. [Please see my note below.] : Please see my note below. [Sincerely,] : Sincerely, [FreeTextEntry3] : Stacey Solis MD, FACS\par

## 2021-09-12 NOTE — PHYSICAL EXAM
[General Appearance - Well Developed] : well developed [] : no respiratory distress [General Appearance - Well Nourished] : well nourished [Oriented To Time, Place, And Person] : oriented to person, place, and time [Not Anxious] : not anxious [No Focal Deficits] : no focal deficits [Normal Station and Gait] : the gait and station were normal for the patient's age

## 2021-09-17 LAB
PSA FREE FLD-MCNC: 14 %
PSA FREE SERPL-MCNC: 0.62 NG/ML
PSA SERPL-MCNC: 4.42 NG/ML

## 2022-03-14 ENCOUNTER — APPOINTMENT (OUTPATIENT)
Dept: UROLOGY | Facility: CLINIC | Age: 62
End: 2022-03-14

## 2022-03-24 ENCOUNTER — NON-APPOINTMENT (OUTPATIENT)
Age: 62
End: 2022-03-24

## 2022-03-24 ENCOUNTER — APPOINTMENT (OUTPATIENT)
Dept: HEART AND VASCULAR | Facility: CLINIC | Age: 62
End: 2022-03-24
Payer: COMMERCIAL

## 2022-03-24 VITALS
DIASTOLIC BLOOD PRESSURE: 70 MMHG | HEIGHT: 72 IN | SYSTOLIC BLOOD PRESSURE: 130 MMHG | HEART RATE: 70 BPM | BODY MASS INDEX: 33.86 KG/M2 | RESPIRATION RATE: 12 BRPM | WEIGHT: 250 LBS

## 2022-03-24 PROCEDURE — 93000 ELECTROCARDIOGRAM COMPLETE: CPT

## 2022-03-24 PROCEDURE — 99214 OFFICE O/P EST MOD 30 MIN: CPT

## 2022-03-24 NOTE — DISCUSSION/SUMMARY
[FreeTextEntry1] : 1. Atrial fibrillation: Continue diltiazem and eliquis. EKG.\par 2. Hypertension: Currently well controlled on Cardizem from a cardiovascular perspective of hypertension perspective no need for additional medication. However given history of diabetes may want to introduce ACE inhibitor for renal protection. To be determined by PCP. Low-dose ACE inhibitor not likely to adversely affect pressure.\par 3. Hyperlipidemia: Continue Crestor

## 2022-03-24 NOTE — HISTORY OF PRESENT ILLNESS
[FreeTextEntry1] : 61-year-old male with a past medical history of atrial fibrillation on anticoagulation hypertension hyperlipidemia and diabetes comes in for routine followup. Overall, feels well denies chest pain shortness of breath PND orthopnea or palpitations.

## 2022-03-24 NOTE — PHYSICAL EXAM

## 2022-04-07 ENCOUNTER — APPOINTMENT (OUTPATIENT)
Dept: UROLOGY | Facility: CLINIC | Age: 62
End: 2022-04-07
Payer: COMMERCIAL

## 2022-04-07 ENCOUNTER — APPOINTMENT (OUTPATIENT)
Dept: HEART AND VASCULAR | Facility: CLINIC | Age: 62
End: 2022-04-07

## 2022-04-07 DIAGNOSIS — N13.8 BENIGN PROSTATIC HYPERPLASIA WITH LOWER URINARY TRACT SYMPMS: ICD-10-CM

## 2022-04-07 DIAGNOSIS — D07.5 CARCINOMA IN SITU OF PROSTATE: ICD-10-CM

## 2022-04-07 DIAGNOSIS — N40.1 BENIGN PROSTATIC HYPERPLASIA WITH LOWER URINARY TRACT SYMPMS: ICD-10-CM

## 2022-04-07 PROCEDURE — 99214 OFFICE O/P EST MOD 30 MIN: CPT | Mod: 95

## 2022-04-07 NOTE — HISTORY OF PRESENT ILLNESS
[Home] : at home, [unfilled] , at the time of the visit. [Medical Office: ([facility])___] : at the medical office located in  [Verbal consent obtained from patient] : the patient, [unfilled] [FreeTextEntry1] : 62 yo with elevated risk 4K\par the patient has never had a biopsy\par no family history of prostate cancer\par normal prior prostate exam\par \par MRI prostate - 8/2020\par 54 grams PIRADS 2\par \par PSA 1/2022\par 4.4 \par 16.8% free\par \par PSA 8/17/21\par 4.42 with 14% FREE\par \par 4K 8/2021\par 13% \par 4.02 PSA\par 16% free\par \par PSA \par 3.55 7/2021\par 20% free\par \par 3.9 1/2021\par 16.4% free\par \par 4K 12% 6/2020\par PSA 4.11 with 17% free\par \par \par

## 2022-04-07 NOTE — ASSESSMENT
[FreeTextEntry1] : 60 yo with elevated risk 4K and elevated PSA\par no history of cancer (familial or prior diagnosis)\par \par discussed the prior MRI\par discussed the prior  4K\par reviewed the most recent PSA\par \par - will repeat PSA in 6 months\par - will re-assess and determine need for repeat MRI of the prostate\par (if PSA continues to rise)

## 2022-06-13 ENCOUNTER — APPOINTMENT (OUTPATIENT)
Dept: UROLOGY | Facility: CLINIC | Age: 62
End: 2022-06-13
Payer: COMMERCIAL

## 2022-06-13 PROCEDURE — 99214 OFFICE O/P EST MOD 30 MIN: CPT | Mod: 95

## 2022-06-13 RX ORDER — BISACODYL 5 MG/1
5 TABLET ORAL
Qty: 2 | Refills: 0 | Status: COMPLETED | COMMUNITY
Start: 2022-03-24

## 2022-06-13 RX ORDER — OMEPRAZOLE 20 MG/1
20 CAPSULE, DELAYED RELEASE ORAL
Qty: 30 | Refills: 0 | Status: ACTIVE | COMMUNITY
Start: 2022-05-20

## 2022-06-13 RX ORDER — FAMOTIDINE 40 MG/1
40 TABLET, FILM COATED ORAL
Qty: 30 | Refills: 0 | Status: COMPLETED | COMMUNITY
Start: 2022-05-21

## 2022-06-13 RX ORDER — POLYETHYLENE GLYCOL 3350 17 G/17G
17 POWDER, FOR SOLUTION ORAL
Qty: 238 | Refills: 0 | Status: COMPLETED | COMMUNITY
Start: 2022-03-24

## 2022-06-13 RX ORDER — MAGNESIUM CITRATE 1.75 G/29.6ML
1.75 LIQUID ORAL
Qty: 296 | Refills: 0 | Status: COMPLETED | COMMUNITY
Start: 2022-03-24

## 2022-06-13 RX ORDER — BENZONATATE 100 MG/1
100 CAPSULE ORAL
Qty: 21 | Refills: 0 | Status: COMPLETED | COMMUNITY
Start: 2021-12-26

## 2022-06-13 RX ORDER — DICLOFENAC SODIUM 1% 10 MG/G
1 GEL TOPICAL
Qty: 100 | Refills: 0 | Status: COMPLETED | COMMUNITY
Start: 2022-01-24

## 2022-06-13 NOTE — LETTER BODY
[Dear  ___] : Dear  [unfilled], [Courtesy Letter:] : I had the pleasure of seeing your patient, [unfilled], in my office today. [Please see my note below.] : Please see my note below. [Consult Closing:] : Thank you very much for allowing me to participate in the care of this patient.  If you have any questions, please do not hesitate to contact me. [Sincerely,] : Sincerely, [FreeTextEntry2] : Desmond Coyle M.D.\par [address] [place] [address]\par [place] [id number]\par

## 2022-06-13 NOTE — ASSESSMENT
[FreeTextEntry1] : I reviewed with him that as per my task and Dr. Solis that if he is going to start testosterone therapy he would first repeat the MRI and that by strict criteria he probably needs a rebiopsy.  By [facility] criteria if the PSA density is less than 0.1 and the MRI is negative then it would be safe to restart hormonal replacement therapy.\par \par The question is what does he want to do.  Living with hypergonadism is not pleasant and they are all the well-known theories of the level of testosterone needed to activate prostate cancer being much less than that needed to have a healthy testosterone level for the rest of once body but that no one could guarantee that starting him on testosterone is risk-free.\par \par My feeling is if he wants to restart in testosterone I will communicate this fact to Dr. Solis who could then arrange for repeat testing and/or biopsy to the point that he is comfortable from an oncologic viewpoint that I can restart him on it.  We would then get blood levels start home on what ever therapy he feels is optimal for him, and follow him both by the oncologist as well as myself as an andrologist\par \par After long discussion going back and forth over the reasons for and against testosterone replacement and that as per Dr. Solis in order to best determine the relative risk of activating prostate cancer if present would require an MRI is requested that we proceed to see what the safety level is.  Going to order hormone levels so I can see what his base level is as if his numbers are above the "saturation point" for prostate cancer raising it higher is unlikely to be deleterious to his health at least by count criteria.  I will also communicate to Dr. Solis to order the MRI as he will have to see him in follow-up after that and then make the decision as to whether or not he feels it safe from an oncologic viewpoint to start testosterone or not.  If he says yes then Mr. Scott would come back to me and we would discuss the options based on the hormone levels that he is getting now and then if we restart him on testosterone follow the hormone levels but also follow the PSA in conjunction with Dr. Solis.\par

## 2022-06-13 NOTE — LETTER HEADER
[FreeTextEntry3] : Martha Reynolds M.D.\par Director of Urology\par [facility]\par [address] [place] [address], [address]\[place] [address]

## 2022-06-13 NOTE — HISTORY OF PRESENT ILLNESS
[Home] : at home, [unfilled] , at the time of the visit. [Medical Office: ([facility])___] : at the medical office located in  [Verbal consent obtained from patient] : the patient, [unfilled] [FreeTextEntry1] : Jessica is a 61-year-old male who I last saw in June 2021 at which time I sent him to Dr. Solis for subspecialty care.  He has seen Dr. Solis July 8, and September 21, 2021 And most recently April 7, 2022.  As per the last note the patient had an MRI in August 2020 there is an elevated PSA he has prostatic intraepithelial neoplasm III and he recommended repeating the PSA in 6 months and then determine the need for repeat MRI.\par \par He is meeting with me to see if he can restart testosterone and if so at what dose.\par \par \par [phone]\par [email or web address]\par unable to leave message for patient

## 2022-06-23 ENCOUNTER — NON-APPOINTMENT (OUTPATIENT)
Age: 62
End: 2022-06-23

## 2022-07-11 ENCOUNTER — RX RENEWAL (OUTPATIENT)
Age: 62
End: 2022-07-11

## 2022-07-11 ENCOUNTER — APPOINTMENT (OUTPATIENT)
Dept: UROLOGY | Facility: CLINIC | Age: 62
End: 2022-07-11

## 2022-07-11 DIAGNOSIS — R79.89 OTHER SPECIFIED ABNORMAL FINDINGS OF BLOOD CHEMISTRY: ICD-10-CM

## 2022-07-11 PROCEDURE — 99214 OFFICE O/P EST MOD 30 MIN: CPT | Mod: 95

## 2022-07-11 NOTE — HISTORY OF PRESENT ILLNESS
[Home] : at home, [unfilled] , at the time of the visit. [Medical Office: ([facility])___] : at the medical office located in  [Verbal consent obtained from patient] : the patient, [unfilled] [FreeTextEntry1] : [phone]\[email or web address]\par \manisha Lopez is a 61-year-old male born November 14, 1960 last seen using telehealth on June 13, 2022.  The question is do we restart him on TRT with his elevated PSA and concern for prostate cancer.  We left that that he would speak to Dr. Solis as, in the last note Dr. Solis indicated the need for further evaluation sooner if we will going to start TRT.  That in retrospect appears to have been a misunderstanding as per the communication with Dr. Solis he felt we should start the TRT and then follow the PSA.  If the PSA goes up Dr. Solis would then order an MRI.  We arrange for Mr. Scott to get baseline labs and then we will discuss which option of testosterone replacement which is to choose based on the risk benefits and coverage.

## 2022-07-11 NOTE — PHYSICAL EXAM
[General Appearance - Well Developed] : well developed [General Appearance - Well Nourished] : well nourished [Normal Appearance] : normal appearance [Well Groomed] : well groomed [General Appearance - In No Acute Distress] : no acute distress [] : no respiratory distress [Respiration, Rhythm And Depth] : normal respiratory rhythm and effort [Exaggerated Use Of Accessory Muscles For Inspiration] : no accessory muscle use [Oriented To Time, Place, And Person] : oriented to person, place, and time [Affect] : the affect was normal [Not Anxious] : not anxious [Mood] : the mood was normal

## 2022-07-11 NOTE — ASSESSMENT
[FreeTextEntry1] : I cannot explain why but his total and bioavailable testosterone are normal.  The direct free testosterone is low and I cannot explain that on the basis of the sex hormone binding globulin but with these numbers off testosterone the question is why should we reconsider starting testosterone especially with his increased risk for prostate cancer with the elevated PSA.\par \par His erections, with the use of 20 mg of Cialis suffice.  Did not write but he feels they are good enough.  He does not have fatigue and in general feels he is doing again good enough.\par \par I cannot explain the low free testosterone with a normal bioavailable and what we will do him as retest in a couple of months.  He tells me he is supposed to do a PSA he thinks sometime in September though the record shows its been ordered for August he is not sure he will be around then.  He can get both sets at the same time.  Were not going to give him a follow-up, he is able to check his on labs and if he looks that his hormones and feel that he wants to consider TRT he will contact me.  Otherwise he will follow-up with Dr. Solis review the PSA with him and decide what to do from that aspect

## 2022-09-08 ENCOUNTER — APPOINTMENT (OUTPATIENT)
Dept: UROLOGY | Facility: CLINIC | Age: 62
End: 2022-09-08

## 2022-11-21 ENCOUNTER — NON-APPOINTMENT (OUTPATIENT)
Age: 62
End: 2022-11-21

## 2022-11-21 ENCOUNTER — APPOINTMENT (OUTPATIENT)
Dept: HEART AND VASCULAR | Facility: CLINIC | Age: 62
End: 2022-11-21

## 2022-11-21 VITALS
SYSTOLIC BLOOD PRESSURE: 132 MMHG | HEIGHT: 72 IN | WEIGHT: 250 LBS | RESPIRATION RATE: 12 BRPM | DIASTOLIC BLOOD PRESSURE: 78 MMHG | BODY MASS INDEX: 33.86 KG/M2 | HEART RATE: 88 BPM

## 2022-11-21 DIAGNOSIS — R09.89 OTHER SPECIFIED SYMPTOMS AND SIGNS INVOLVING THE CIRCULATORY AND RESPIRATORY SYSTEMS: ICD-10-CM

## 2022-11-21 DIAGNOSIS — I34.0 NONRHEUMATIC MITRAL (VALVE) INSUFFICIENCY: ICD-10-CM

## 2022-11-21 PROCEDURE — 93306 TTE W/DOPPLER COMPLETE: CPT

## 2022-11-21 PROCEDURE — 99214 OFFICE O/P EST MOD 30 MIN: CPT | Mod: 25

## 2022-11-21 PROCEDURE — 93880 EXTRACRANIAL BILAT STUDY: CPT

## 2022-11-21 PROCEDURE — 93000 ELECTROCARDIOGRAM COMPLETE: CPT

## 2022-11-21 PROCEDURE — ZZZZZ: CPT

## 2022-11-21 RX ORDER — METFORMIN ER 500 MG 500 MG/1
500 TABLET ORAL
Qty: 120 | Refills: 0 | Status: DISCONTINUED | COMMUNITY
Start: 2022-06-01 | End: 2022-11-21

## 2022-11-21 NOTE — PHYSICAL EXAM

## 2022-11-21 NOTE — HISTORY OF PRESENT ILLNESS
[FreeTextEntry1] : 62-year-old male with a past medical history of A. fib hypertension hyperlipidemia and diabetes comes in for routine follow-up.  Overall, feels well denies chest pain shortness of breath PND orthopnea or palpitations

## 2022-11-21 NOTE — DISCUSSION/SUMMARY
[FreeTextEntry1] : 1.  Atrial fibrillation: Continue diltiazem Eliquis.  EKG.\par 2.  Mitral insufficiency: Echocardiogram\par 3.  Carotid bruit: Carotid duplex\par 4.  Hypertension: Adequately controlled on current medication advised to continue\par 5.  Hyperlipidemia: Continue Crestor [EKG obtained to assist in diagnosis and management of assessed problem(s)] : EKG obtained to assist in diagnosis and management of assessed problem(s)

## 2022-12-01 ENCOUNTER — APPOINTMENT (OUTPATIENT)
Dept: UROLOGY | Facility: CLINIC | Age: 62
End: 2022-12-01
Payer: COMMERCIAL

## 2022-12-01 DIAGNOSIS — R97.20 ELEVATED PROSTATE, SPECIFIC ANTIGEN [PSA]: ICD-10-CM

## 2022-12-01 PROCEDURE — 99448 NTRPROF PH1/NTRNET/EHR 21-30: CPT

## 2022-12-03 PROBLEM — R97.20 ELEVATED PROSTATE SPECIFIC ANTIGEN (PSA): Status: ACTIVE | Noted: 2021-07-12

## 2022-12-03 NOTE — HISTORY OF PRESENT ILLNESS
[Home] : at home, [unfilled] , at the time of the visit. [Medical Office: ([facility])___] : at the medical office located in  [Verbal consent obtained from patient] : the patient, [unfilled] [FreeTextEntry1] : attempted via [employer] but patient was not able to use the telehealth component so completed using audio only\par \par 61 yo with elevated risk 4K\par the patient has never had a biopsy\par no family history of prostate cancer\par normal prior prostate exam\par \par MRI prostate - 8/2020\par 54 grams PIRADS 2\par \par PSA 3.5 ng/ml 9/2022 - PSA density <0.1 ng/ml - with pirads 2 lesion)\par 16% free\par \par PSA 1/2022\par 4.4 \par 16.8% free\par \par PSA 8/17/21\par 4.42 with 14% FREE\par \par 4K 8/2021\par 13% \par 4.02 PSA\par 16% free\par \par PSA \par 3.55 7/2021\par 20% free\par \par 3.9 1/2021\par 16.4% free\par \par 4K 12% 6/2020\par PSA 4.11 with 17% free\par \par \par

## 2022-12-03 NOTE — PHYSICAL EXAM
[General Appearance - Well Developed] : well developed [General Appearance - Well Nourished] : well nourished [] : no respiratory distress [Oriented To Time, Place, And Person] : oriented to person, place, and time [Not Anxious] : not anxious

## 2022-12-03 NOTE — ASSESSMENT
[FreeTextEntry1] : 63 yo with fluctuating PSA - now below 4\par MRI without clear evidence of abnormal lesion\par discussed the need to continue monitoring PSA every 6 months\par f/u with Dr. Stewart\par \par - if PSA rises will recommend that we obtain an MRI \par - f/u with me as needed

## 2023-08-24 ENCOUNTER — NON-APPOINTMENT (OUTPATIENT)
Age: 63
End: 2023-08-24

## 2023-08-24 ENCOUNTER — APPOINTMENT (OUTPATIENT)
Dept: HEART AND VASCULAR | Facility: CLINIC | Age: 63
End: 2023-08-24
Payer: COMMERCIAL

## 2023-08-24 VITALS
BODY MASS INDEX: 34.81 KG/M2 | RESPIRATION RATE: 12 BRPM | WEIGHT: 257 LBS | SYSTOLIC BLOOD PRESSURE: 122 MMHG | HEART RATE: 80 BPM | HEIGHT: 72 IN | DIASTOLIC BLOOD PRESSURE: 82 MMHG

## 2023-08-24 DIAGNOSIS — Z86.39 PERSONAL HISTORY OF OTHER ENDOCRINE, NUTRITIONAL AND METABOLIC DISEASE: ICD-10-CM

## 2023-08-24 PROCEDURE — 93000 ELECTROCARDIOGRAM COMPLETE: CPT

## 2023-08-24 PROCEDURE — 99214 OFFICE O/P EST MOD 30 MIN: CPT | Mod: 25

## 2023-08-24 NOTE — PHYSICAL EXAM

## 2023-08-24 NOTE — DISCUSSION/SUMMARY
[FreeTextEntry1] : 1.  Atrial fibrillation: Continue Eliquis diltiazem.  EKG. 2.  Hyperlipidemia: Continue Crestor 3.  Hypertension: Continue current medication. [EKG obtained to assist in diagnosis and management of assessed problem(s)] : EKG obtained to assist in diagnosis and management of assessed problem(s)

## 2023-08-24 NOTE — HISTORY OF PRESENT ILLNESS
[FreeTextEntry1] : 62-year-old male with past medical history of atrial fibrillation diabetes GERD hyperlipidemia comes in for routine follow-up.  Overall, feels well denies any chest pain shortness of breath PND orthopnea.

## 2024-01-17 ENCOUNTER — NON-APPOINTMENT (OUTPATIENT)
Age: 64
End: 2024-01-17

## 2024-01-17 ENCOUNTER — APPOINTMENT (OUTPATIENT)
Dept: HEART AND VASCULAR | Facility: CLINIC | Age: 64
End: 2024-01-17
Payer: COMMERCIAL

## 2024-01-17 VITALS
HEART RATE: 81 BPM | HEIGHT: 72 IN | DIASTOLIC BLOOD PRESSURE: 80 MMHG | RESPIRATION RATE: 12 BRPM | SYSTOLIC BLOOD PRESSURE: 130 MMHG | BODY MASS INDEX: 34.4 KG/M2 | WEIGHT: 254 LBS

## 2024-01-17 DIAGNOSIS — I10 ESSENTIAL (PRIMARY) HYPERTENSION: ICD-10-CM

## 2024-01-17 DIAGNOSIS — I48.91 UNSPECIFIED ATRIAL FIBRILLATION: ICD-10-CM

## 2024-01-17 PROCEDURE — 99214 OFFICE O/P EST MOD 30 MIN: CPT | Mod: 25

## 2024-01-17 PROCEDURE — 93000 ELECTROCARDIOGRAM COMPLETE: CPT

## 2024-01-17 RX ORDER — ROSUVASTATIN CALCIUM 10 MG/1
10 TABLET, FILM COATED ORAL
Qty: 30 | Refills: 5 | Status: ACTIVE | COMMUNITY
Start: 2019-01-03

## 2024-01-17 RX ORDER — METFORMIN HYDROCHLORIDE 1000 MG/1
1000 TABLET, FILM COATED, EXTENDED RELEASE ORAL
Qty: 60 | Refills: 0 | Status: ACTIVE | COMMUNITY
Start: 2018-11-28

## 2024-01-17 NOTE — DISCUSSION/SUMMARY
[Procedure Low Risk] : the procedure risk is low [Patient Low Risk] : the patient is a low surgical risk [FreeTextEntry1] : 1.  Atrial fibrillation: Continue Eliquis diltiazem.  EKG. 2.  Hyperlipidemia: Continue Crestor 3.  Hypertension: Continue current medication.  Cleared for planned EGD CF  hold Eliquis's x 4 doses  hold metformin as he will be NPO  continue Cardizem  [EKG obtained to assist in diagnosis and management of assessed problem(s)] : EKG obtained to assist in diagnosis and management of assessed problem(s)

## 2024-01-17 NOTE — HISTORY OF PRESENT ILLNESS
[Preoperative Visit] : for a medical evaluation prior to surgery [Scheduled Procedure ___] : a [unfilled] [Surgeon Name ___] : surgeon: [unfilled] [Fever] : no fever [Chills] : no chills [Fatigue] : no fatigue [Chest Pain] : no chest pain [Diabetes] : no diabetes [Pulmonary Disease] : no pulmonary disease [Renal Disease] : no renal disease [GI Disease] : no gastrointestinal disease [Frequent Aspirin Use] : no frequent aspirin use [Prior Anesthesia] : Prior anesthesia [Prev Anesthesia Reaction] : no previous anesthesia reaction [Electrocardiogram] : ~T an ECG ~C was performed [Metabolic Capacity ___Mets%] : The patient has a metabolic capacity of [unfilled] Mets%  [Good] : Good [Anesthesia Reaction] : no anesthesia reaction [Sudden Death] : no sudden death [Clotting Disorder] : no clotting disorder [Bleeding Disorder] : no bleeding disorder [de-identified] : DR Hutchison  [FreeTextEntry1] : 1/17/24 62-year-old male with past medical history of atrial fibrillation diabetes GERD hyperlipidemia comes in for routine follow-up.  Overall, feels well denies any chest pain shortness of breath PND orthopnea. Has planned EGD and CF and needs clearance for stopping DOAC for AFIB  no cva

## 2024-01-17 NOTE — PHYSICAL EXAM
· Continue metoprolol  · Monitor BP [Well Developed] : well developed [Well Nourished] : well nourished [No Acute Distress] : no acute distress [Normal Venous Pressure] : normal venous pressure [Carotid Bruit] : carotid bruit [Normal S1, S2] : normal S1, S2 [No Rub] : no rub [No Gallop] : no gallop [Murmur] : murmur [Clear Lung Fields] : clear lung fields [Good Air Entry] : good air entry [No Respiratory Distress] : no respiratory distress  [Soft] : abdomen soft [Non Tender] : non-tender [No Masses/organomegaly] : no masses/organomegaly [Normal Bowel Sounds] : normal bowel sounds [Normal Gait] : normal gait [No Edema] : no edema [No Cyanosis] : no cyanosis [No Clubbing] : no clubbing [No Varicosities] : no varicosities [No Rash] : no rash [No Skin Lesions] : no skin lesions [Moves all extremities] : moves all extremities [No Focal Deficits] : no focal deficits [Normal Speech] : normal speech [Alert and Oriented] : alert and oriented [Normal memory] : normal memory [de-identified] : irreg irreg [General Appearance - Well Developed] : well developed [Normal Appearance] : normal appearance [Well Groomed] : well groomed [General Appearance - Well Nourished] : well nourished [No Deformities] : no deformities [General Appearance - In No Acute Distress] : no acute distress [Normal Conjunctiva] : the conjunctiva exhibited no abnormalities [Eyelids - No Xanthelasma] : the eyelids demonstrated no xanthelasmas [Normal Oral Mucosa] : normal oral mucosa [No Oral Pallor] : no oral pallor [No Oral Cyanosis] : no oral cyanosis [Normal Jugular Venous A Waves Present] : normal jugular venous A waves present [Normal Jugular Venous V Waves Present] : normal jugular venous V waves present [No Jugular Venous Billy A Waves] : no jugular venous billy A waves [Normal] : normal [Normal Rate] : normal [Irregularly Irregular] : irregularly irregular [II] : a grade 2 [No Pitting Edema] : no pitting edema present [Abdomen Soft] : soft [Abdomen Tenderness] : non-tender [Abdomen Mass (___ Cm)] : no abdominal mass palpated [Abnormal Walk] : normal gait [Gait - Sufficient For Exercise Testing] : the gait was sufficient for exercise testing [Nail Clubbing] : no clubbing of the fingernails [Cyanosis, Localized] : no localized cyanosis [Petechial Hemorrhages (___cm)] : no petechial hemorrhages [Skin Color & Pigmentation] : normal skin color and pigmentation [] : no rash [No Venous Stasis] : no venous stasis [Skin Lesions] : no skin lesions [No Skin Ulcers] : no skin ulcer [No Xanthoma] : no  xanthoma was observed

## 2024-03-12 ENCOUNTER — RX RENEWAL (OUTPATIENT)
Age: 64
End: 2024-03-12

## 2024-03-12 RX ORDER — DILTIAZEM HYDROCHLORIDE 360 MG/1
360 CAPSULE, COATED, EXTENDED RELEASE ORAL
Qty: 90 | Refills: 3 | Status: ACTIVE | COMMUNITY
Start: 2020-06-24 | End: 1900-01-01

## 2024-04-08 ENCOUNTER — RX RENEWAL (OUTPATIENT)
Age: 64
End: 2024-04-08

## 2024-04-08 RX ORDER — APIXABAN 5 MG/1
5 TABLET, FILM COATED ORAL
Qty: 180 | Refills: 1 | Status: ACTIVE | COMMUNITY
Start: 2018-12-10 | End: 1900-01-01

## 2024-07-08 ENCOUNTER — RX RENEWAL (OUTPATIENT)
Age: 64
End: 2024-07-08

## 2024-07-08 RX ORDER — DILTIAZEM HYDROCHLORIDE 360 MG/1
360 CAPSULE, EXTENDED RELEASE ORAL
Qty: 90 | Refills: 0 | Status: ACTIVE | COMMUNITY
Start: 2024-07-08 | End: 1900-01-01

## 2024-12-05 ENCOUNTER — APPOINTMENT (OUTPATIENT)
Dept: HEART AND VASCULAR | Facility: CLINIC | Age: 64
End: 2024-12-05
Payer: COMMERCIAL

## 2024-12-05 ENCOUNTER — NON-APPOINTMENT (OUTPATIENT)
Age: 64
End: 2024-12-05

## 2024-12-05 ENCOUNTER — APPOINTMENT (OUTPATIENT)
Dept: HEART AND VASCULAR | Facility: CLINIC | Age: 64
End: 2024-12-05

## 2024-12-05 VITALS
HEART RATE: 78 BPM | HEIGHT: 72 IN | BODY MASS INDEX: 33.72 KG/M2 | DIASTOLIC BLOOD PRESSURE: 80 MMHG | SYSTOLIC BLOOD PRESSURE: 124 MMHG | RESPIRATION RATE: 12 BRPM | WEIGHT: 249 LBS

## 2024-12-05 DIAGNOSIS — I10 ESSENTIAL (PRIMARY) HYPERTENSION: ICD-10-CM

## 2024-12-05 DIAGNOSIS — Z86.39 PERSONAL HISTORY OF OTHER ENDOCRINE, NUTRITIONAL AND METABOLIC DISEASE: ICD-10-CM

## 2024-12-05 DIAGNOSIS — R09.89 OTHER SPECIFIED SYMPTOMS AND SIGNS INVOLVING THE CIRCULATORY AND RESPIRATORY SYSTEMS: ICD-10-CM

## 2024-12-05 DIAGNOSIS — I34.0 NONRHEUMATIC MITRAL (VALVE) INSUFFICIENCY: ICD-10-CM

## 2024-12-05 DIAGNOSIS — I48.91 UNSPECIFIED ATRIAL FIBRILLATION: ICD-10-CM

## 2024-12-05 PROCEDURE — 99214 OFFICE O/P EST MOD 30 MIN: CPT

## 2024-12-05 PROCEDURE — 93000 ELECTROCARDIOGRAM COMPLETE: CPT

## 2024-12-05 PROCEDURE — G2211 COMPLEX E/M VISIT ADD ON: CPT

## 2024-12-05 RX ORDER — EMPAGLIFLOZIN 10 MG/1
10 TABLET, FILM COATED ORAL
Refills: 0 | Status: ACTIVE | COMMUNITY